# Patient Record
Sex: FEMALE | Race: WHITE | NOT HISPANIC OR LATINO | Employment: OTHER | URBAN - METROPOLITAN AREA
[De-identification: names, ages, dates, MRNs, and addresses within clinical notes are randomized per-mention and may not be internally consistent; named-entity substitution may affect disease eponyms.]

---

## 2023-09-06 ENCOUNTER — TELEPHONE (OUTPATIENT)
Dept: PRIMARY CARE | Facility: CLINIC | Age: 53
End: 2023-09-06

## 2023-09-06 DIAGNOSIS — Z00.00 ROUTINE HEALTH MAINTENANCE: ICD-10-CM

## 2023-09-06 NOTE — TELEPHONE ENCOUNTER
Pt calling she never had her labs drawn in Jan she would like to go this Fri can these be reactivated in her chart?

## 2023-09-06 NOTE — TELEPHONE ENCOUNTER
Last appt. 1/5/23, Next appt. 1/9/2024  Last lab 2/16/2021 per Dr. Caraballo for CMP, CBC, and Lipids  Doesn't look like she has many chronic issues.

## 2023-09-07 NOTE — TELEPHONE ENCOUNTER
LM notifying pt. Lab orders are on her chart and she should be fasting to call again if further questions.

## 2023-09-07 NOTE — TELEPHONE ENCOUNTER
Pt called in asking if labs have been added to chart, I advised pt that the message was sent, it may take 24-48 hours.

## 2023-09-08 ENCOUNTER — LAB (OUTPATIENT)
Dept: LAB | Facility: LAB | Age: 53
End: 2023-09-08
Payer: COMMERCIAL

## 2023-09-08 DIAGNOSIS — Z00.00 ROUTINE HEALTH MAINTENANCE: ICD-10-CM

## 2023-09-08 LAB
ALANINE AMINOTRANSFERASE (SGPT) (U/L) IN SER/PLAS: 23 U/L (ref 7–45)
ALBUMIN (G/DL) IN SER/PLAS: 4.2 G/DL (ref 3.4–5)
ALKALINE PHOSPHATASE (U/L) IN SER/PLAS: 142 U/L (ref 33–110)
ANION GAP IN SER/PLAS: 11 MMOL/L (ref 10–20)
ASPARTATE AMINOTRANSFERASE (SGOT) (U/L) IN SER/PLAS: 22 U/L (ref 9–39)
BILIRUBIN TOTAL (MG/DL) IN SER/PLAS: 0.2 MG/DL (ref 0–1.2)
CALCIUM (MG/DL) IN SER/PLAS: 9.9 MG/DL (ref 8.6–10.6)
CARBON DIOXIDE, TOTAL (MMOL/L) IN SER/PLAS: 29 MMOL/L (ref 21–32)
CHLORIDE (MMOL/L) IN SER/PLAS: 104 MMOL/L (ref 98–107)
CHOLESTEROL (MG/DL) IN SER/PLAS: 244 MG/DL (ref 0–199)
CHOLESTEROL IN HDL (MG/DL) IN SER/PLAS: 63.8 MG/DL
CHOLESTEROL/HDL RATIO: 3.8
CREATININE (MG/DL) IN SER/PLAS: 0.97 MG/DL (ref 0.5–1.05)
ERYTHROCYTE DISTRIBUTION WIDTH (RATIO) BY AUTOMATED COUNT: 14 % (ref 11.5–14.5)
ERYTHROCYTE MEAN CORPUSCULAR HEMOGLOBIN CONCENTRATION (G/DL) BY AUTOMATED: 32.8 G/DL (ref 32–36)
ERYTHROCYTE MEAN CORPUSCULAR VOLUME (FL) BY AUTOMATED COUNT: 92 FL (ref 80–100)
ERYTHROCYTES (10*6/UL) IN BLOOD BY AUTOMATED COUNT: 5.06 X10E12/L (ref 4–5.2)
FOLLITROPIN (IU/L) IN SER/PLAS: 109.9 IU/L
GFR FEMALE: 71 ML/MIN/1.73M2
GLUCOSE (MG/DL) IN SER/PLAS: 78 MG/DL (ref 74–99)
HEMATOCRIT (%) IN BLOOD BY AUTOMATED COUNT: 46.7 % (ref 36–46)
HEMOGLOBIN (G/DL) IN BLOOD: 15.3 G/DL (ref 12–16)
LDL: 151 MG/DL (ref 0–99)
LEUKOCYTES (10*3/UL) IN BLOOD BY AUTOMATED COUNT: 8.5 X10E9/L (ref 4.4–11.3)
NRBC (PER 100 WBCS) BY AUTOMATED COUNT: 0 /100 WBC (ref 0–0)
PLATELETS (10*3/UL) IN BLOOD AUTOMATED COUNT: 250 X10E9/L (ref 150–450)
POTASSIUM (MMOL/L) IN SER/PLAS: 3.8 MMOL/L (ref 3.5–5.3)
PROTEIN TOTAL: 7.2 G/DL (ref 6.4–8.2)
SODIUM (MMOL/L) IN SER/PLAS: 140 MMOL/L (ref 136–145)
TRIGLYCERIDE (MG/DL) IN SER/PLAS: 148 MG/DL (ref 0–149)
UREA NITROGEN (MG/DL) IN SER/PLAS: 20 MG/DL (ref 6–23)
VLDL: 30 MG/DL (ref 0–40)

## 2023-09-08 PROCEDURE — 80061 LIPID PANEL: CPT

## 2023-09-08 PROCEDURE — 36415 COLL VENOUS BLD VENIPUNCTURE: CPT

## 2023-09-08 PROCEDURE — 80053 COMPREHEN METABOLIC PANEL: CPT

## 2023-09-08 PROCEDURE — 85027 COMPLETE CBC AUTOMATED: CPT

## 2024-01-05 PROBLEM — R53.81 MALAISE AND FATIGUE: Status: ACTIVE | Noted: 2024-01-05

## 2024-01-05 PROBLEM — R53.83 MALAISE AND FATIGUE: Status: ACTIVE | Noted: 2024-01-05

## 2024-01-05 PROBLEM — K51.90 ULCERATIVE COLITIS (MULTI): Status: ACTIVE | Noted: 2024-01-05

## 2024-01-05 PROBLEM — E66.3 OVERWEIGHT (BMI 25.0-29.9): Status: ACTIVE | Noted: 2019-01-10

## 2024-01-05 PROBLEM — R33.9 URINARY RETENTION: Status: ACTIVE | Noted: 2024-01-05

## 2024-01-05 PROBLEM — E55.9 VITAMIN D DEFICIENCY: Status: ACTIVE | Noted: 2024-01-05

## 2024-01-05 RX ORDER — CALCIUM CARBONATE/VITAMIN D3 600 MG-10
1 TABLET ORAL DAILY
COMMUNITY
Start: 2023-01-05

## 2024-01-05 RX ORDER — SODIUM PICOSULFATE, MAGNESIUM OXIDE, AND ANHYDROUS CITRIC ACID 10; 3.5; 12 MG/160ML; G/160ML; G/160ML
LIQUID ORAL
COMMUNITY
Start: 2023-04-04 | End: 2024-01-09 | Stop reason: ALTCHOICE

## 2024-01-05 RX ORDER — ASCORBIC ACID 500 MG
1 TABLET ORAL DAILY
COMMUNITY
Start: 2023-01-05

## 2024-01-05 RX ORDER — VIT C/E/ZN/COPPR/LUTEIN/ZEAXAN 250MG-90MG
1 CAPSULE ORAL DAILY
COMMUNITY
Start: 2023-01-05

## 2024-01-05 RX ORDER — BIOTIN 10 MG
1 TABLET ORAL DAILY
COMMUNITY

## 2024-01-05 RX ORDER — FLUTICASONE PROPIONATE 50 MCG
SPRAY, SUSPENSION (ML) NASAL
COMMUNITY
Start: 2014-04-21

## 2024-01-05 RX ORDER — MESALAMINE 1.2 G/1
4.8 TABLET, DELAYED RELEASE ORAL
COMMUNITY

## 2024-01-05 RX ORDER — POLYETHYLENE GLYCOL 3350 17 G/17G
POWDER, FOR SOLUTION ORAL
COMMUNITY
Start: 2018-12-17 | End: 2024-01-09 | Stop reason: ALTCHOICE

## 2024-01-05 RX ORDER — COPPER 313.4 MG/1
INTRAUTERINE DEVICE INTRAUTERINE
COMMUNITY

## 2024-01-09 ENCOUNTER — OFFICE VISIT (OUTPATIENT)
Dept: PRIMARY CARE | Facility: CLINIC | Age: 54
End: 2024-01-09
Payer: COMMERCIAL

## 2024-01-09 VITALS
BODY MASS INDEX: 29.07 KG/M2 | HEART RATE: 65 BPM | WEIGHT: 154 LBS | HEIGHT: 61 IN | OXYGEN SATURATION: 96 % | SYSTOLIC BLOOD PRESSURE: 112 MMHG | DIASTOLIC BLOOD PRESSURE: 70 MMHG

## 2024-01-09 DIAGNOSIS — E55.9 VITAMIN D DEFICIENCY: ICD-10-CM

## 2024-01-09 DIAGNOSIS — Z00.00 WELL ADULT EXAM: Primary | ICD-10-CM

## 2024-01-09 DIAGNOSIS — K51.00 ULCERATIVE PANCOLITIS WITHOUT COMPLICATION (MULTI): ICD-10-CM

## 2024-01-09 DIAGNOSIS — R53.83 FATIGUE, UNSPECIFIED TYPE: ICD-10-CM

## 2024-01-09 PROCEDURE — 99396 PREV VISIT EST AGE 40-64: CPT | Performed by: FAMILY MEDICINE

## 2024-01-09 PROCEDURE — 1036F TOBACCO NON-USER: CPT | Performed by: FAMILY MEDICINE

## 2024-01-09 RX ORDER — GLUCOSAM/CHONDRO/HERB 149/HYAL 750-100 MG
TABLET ORAL
COMMUNITY

## 2024-01-09 NOTE — PROGRESS NOTES
ROS :  ( No or Yes )  Do you have:  Any eye problems:    N  2. Frequent nasal congestion or sneezing:  N  3. Difficulty hearing:  N  4. Ear problems:   N  Are you troubled by:  5. Asthma or wheezing:   N  6. Frequent cough:   N  7. Shortness of breath:N  8. Hemoptysis: N  9. Hx of TB: N  Do you have or have you been told you had:  10. High blood pressure: N  11. Heart disease: N  12. Heart murmur: N  Do you ever have:  13. Chest pain or pressure with exertion:N  14. Leg pains with walking up hill: N  15. Fast heartbeat or palpitations:N  16. Varicose veins: N  Do you have or are you troubled by:  17. Difficulty swallowing foods or liquids: N  18. Abdominal pains: N  19. Frequent indigestion or heartburn: N  20. Constipation: N  21. Diarrhea or loose stools: N  Has there been a definite change:  22. In weight recently: N  23. In bowel movements: N  Have you ever had or been told you have:  24. An ulcer: N  25. Black stools: N  26. Jaundice, hepatitis or liver problems: N  27. Gallstones or gallbladder problems: N  28. Stomach or intestinal problems: N  Have you ever:  29. Vomited blood : N  30. Blood in bowel movements: N  31. Been anemic or been treated for blood problems: N  32. Had sickle cell trait or anemia: N  33. Been refused as a blood donor:   Have you had or do you have:  34. Problems with your kidney, bladder, or prostate: N  35. Loss of control of your urine: N  36. Pain or burning with urination: N  37. Blood in your urine: N  38. Trouble starting flow of urine: N  39. Frequent urination at night: N  Have you ever been treated for or told you had:  40. Venereal disease: N  Do you have:  41. Any skin problems: N  42. Diabetes: N  43. Thyroid disease: N  Are you troubled by:  44. Frequent back pain: N  45. Pain or swelling around joints: N  Have you ever:  46. Broken any bones: N  Are you troubled by:  47. Frequent headaches: N  48. Dizziness: N  49. Had Seizures or convulsions: N  50. Temporarily lost  "control of your hand or foot : N   51. Had a stroke or been paralyzed : N  52. Temporarily lost your ability to speak: N  53. Fainted or lost consciousness: N  Have you ever had:  54. Hallucinations: N  55. Much trouble with Nervousness: N  56. Do you take medications for your nerves: N  57. Trouble falling asleep or staying asleep: N  58. Do you feel tired even after a good night sleep: N  59. Do you often feel down in the dumps or depressed: N  60. Do you often feel like crying without any reason: N  61. Do you think that you may be using alcohol excessively: N  62. Do you use any street drugs : N    Do have any other medical problems that are concerning to you :   Subjective   Patient ID: Christy Lim is a 53 y.o. female who presents for Annual Exam (Dr. Caraballo orders mammograms).    HPI   Deciding on her gastroenterologist for ulcerative colitis  No current symptoms and continues to take mesalamine    Review of Systems    Objective   /70   Pulse 65   Ht 1.537 m (5' 0.5\")   Wt 69.9 kg (154 lb)   LMP  (LMP Unknown) Comment: IUD  SpO2 96%   BMI 29.58 kg/m²     Physical Exam  HENT:      Head: Normocephalic and atraumatic.      Nose: Nose normal.      Mouth/Throat:      Mouth: Mucous membranes are moist.      Pharynx: No oropharyngeal exudate.   Eyes:      Extraocular Movements: Extraocular movements intact.      Conjunctiva/sclera: Conjunctivae normal.      Pupils: Pupils are equal, round, and reactive to light.   Cardiovascular:      Rate and Rhythm: Normal rate and regular rhythm.   Pulmonary:      Effort: Pulmonary effort is normal.   Abdominal:      General: There is no distension.      Palpations: Abdomen is soft.   Musculoskeletal:      Cervical back: Normal range of motion and neck supple.   Lymphadenopathy:      Cervical: No cervical adenopathy.   Neurological:      General: No focal deficit present.      Mental Status: She is alert.   Psychiatric:         Attention and Perception: Attention " normal.         Speech: Speech normal.         Behavior: Behavior is cooperative.         Assessment/Plan   Diagnoses and all orders for this visit:  Well adult exam  Comments:   LM discussed  Orders:  -     Lipid Panel; Future  -     CBC and Auto Differential; Future  -     Comprehensive Metabolic Panel; Future  -     Sedimentation Rate; Future  -     C-Reactive Protein; Future  Ulcerative pancolitis without complication (CMS/HCC)  Comments:  Patient will follow-up with gastroenterology and continue current medication  Orders:  -     Lipid Panel; Future  -     CBC and Auto Differential; Future  -     Comprehensive Metabolic Panel; Future  -     Sedimentation Rate; Future  -     C-Reactive Protein; Future  Vitamin D deficiency  -     Vitamin D 25-Hydroxy,Total (for eval of Vitamin D levels); Future  Fatigue, unspecified type  -     Thyroid Stimulating Hormone; Future    Recheck 1 year sooner if any issues arise

## 2024-01-26 ENCOUNTER — OFFICE VISIT (OUTPATIENT)
Dept: OBSTETRICS AND GYNECOLOGY | Facility: CLINIC | Age: 54
End: 2024-01-26
Payer: COMMERCIAL

## 2024-01-26 VITALS
WEIGHT: 153 LBS | HEIGHT: 61 IN | BODY MASS INDEX: 28.89 KG/M2 | DIASTOLIC BLOOD PRESSURE: 80 MMHG | SYSTOLIC BLOOD PRESSURE: 118 MMHG

## 2024-01-26 DIAGNOSIS — Z30.432 ENCOUNTER FOR IUD REMOVAL: ICD-10-CM

## 2024-01-26 DIAGNOSIS — Z01.419 ENCOUNTER FOR WELL WOMAN EXAM WITH ROUTINE GYNECOLOGICAL EXAM: Primary | ICD-10-CM

## 2024-01-26 DIAGNOSIS — Z12.31 ENCOUNTER FOR SCREENING MAMMOGRAM FOR BREAST CANCER: ICD-10-CM

## 2024-01-26 PROCEDURE — 99396 PREV VISIT EST AGE 40-64: CPT | Performed by: OBSTETRICS & GYNECOLOGY

## 2024-01-26 PROCEDURE — 58301 REMOVE INTRAUTERINE DEVICE: CPT | Performed by: OBSTETRICS & GYNECOLOGY

## 2024-01-26 PROCEDURE — 1036F TOBACCO NON-USER: CPT | Performed by: OBSTETRICS & GYNECOLOGY

## 2024-01-26 NOTE — PROGRESS NOTES
PAP 1-27-23 NEG HPV NEG  MAMMO 1-  DEXA NEVER  COLON 3-5-20    ASSESSMENT/PLAN  1. Encounter for well woman exam with routine gynecological exam  Normal well woman visit.   No pap done. Last pap 2023 was normal and HPV negative.     2. Encounter for screening mammogram for breast cancer  A screening mammogram requisition has been placed.   Please schedule your mammogram     3. Encounter for IUD removal  Paragard IUD removed without issue.     SUBJECTIVE    HPI    52 yo G0 for routine well woman visit.   Last visit 3/2022.   Paragard IUD placed 6/2020. Last cycle approx 2 years ago. FSH 9-8-2023 109 consistent with menopause. When I called pt with results, we talked about removing paragard at next visit. Pt not sure if she wants it out at this visit. Was very painful going in. We discussed that removal is easier. Pt agrees to removal today.   Overall pt coping okay without any major menopausal sx. Emotionally she has found it challenging to know that menopause definitively means she can never have a baby. She is working through this concept but overall coping okay.   Ulcerative colitis is stable.   Up to date with PCP Dr. Wadsworth.   Denies any intervening medical or surgical issues.   , no smoke, occ ETOH.    Review of Systems    Constitutional: no fever, no chills, no recent weight gain, no recent weight loss and no fatigue.   Eyes: no eye pain, no vision problems and no dryness of the eyes.   ENT: no hearing loss, no nosebleeds and no sinus congestion.   Cardiovascular: no chest pain, no palpitations and no orthopnea.   Respiratory: no shortness of breath, no cough and no wheezing.   Gastrointestinal: no abdominal pain, no constipation, no nausea, no diarrhea and no vomiting.   Genitourinary: no pelvic pain, no dysuria, no urinary incontinence, no vaginal dryness, no vaginal itching, no dyspareunia, no dysmenorrhea, no sexual problems, no change in urinary frequency, no vaginal discharge, no  "unexplained vaginal bleeding and no lesion/sore.   Musculoskeletal: no back pain, no joint swelling and no leg edema.   Integumentary: no rashes, no skin lesions, no breast pain, no nipple discharge and no breast lump.   Neurological: no headache, no numbness and no dizziness.   Psychiatric: no sleep disturbances, no anxiety and no depression.   Endocrine: no hot flashes, no loss of hair and no hirsutism.   Hematologic/Lymphatic: no swollen glands, no tendency for easy bleeding and no tendency for easy bruising.      OBJECTIVE    /80   Ht 1.543 m (5' 0.75\")   Wt 69.4 kg (153 lb)   LMP  (LMP Unknown) Comment: IUD  BMI 29.15 kg/m²     Physical Exam     Constitutional: Alert and in no acute distress. Well developed, well nourished   Head and Face: Head and face: normal   Eyes: Normal external exam - nonicteric sclera, extraocular movements intact (EOMI) and no ptosis.   Ears, Nose, Mouth, and Throat: External inspection of ears and nose: normal   Neck: no neck asymmetry. Supple and thyroid not enlarged and there were no palpable thyroid nodules   Cardiovascular: Heart rate and rhythm were normal, normal S1 and S2, no gallops, and no murmurs   Pulmonary: No respiratory distress and clear bilateral breath sounds   Chest: Breasts: normal appearance, no nipple discharge and no skin changes and palpation of breasts and axillae: no palpable mass and no axillary lymphadenopathy   Abdomen: soft nontender; no abdominal mass palpated, no organomegaly and no hernias   Genitourinary: external genitalia: normal, no inguinal lymphadenopathy, Bartholin's urethral and Harleigh's glands: normal, urethra: normal, bladder: normal on palpation and perianal area: normal   Vagina: normal.   Cervix: Normal appearing without lesions. Strings at os.   Uterus: Normal, mobile, nontender.  Right Adnexa/parametria: Normal.   Left Adnexa/parametria: Normal.   Skin: normal skin color and pigmentation, normal skin turgor, and no rash. "   Psychiatric: alert and oriented x 3., affect normal to patient baseline and mood: appropriate      IUD Removal    Date/Time: 1/26/2024 3:01 PM    Performed by: Claudine Tate MD  Authorized by: Claudine Tate MD    Consent:     Consent obtained:  Written and verbal    Consent given by:  Patient    Procedure risks and benefits discussed: yes      Patient questions answered: yes      Patient agrees, verbalizes understanding, and wants to proceed: yes    Procedure:     Removed with no complications: yes       Claudine Tate MD

## 2024-04-26 ENCOUNTER — HOSPITAL ENCOUNTER (OUTPATIENT)
Dept: RADIOLOGY | Facility: CLINIC | Age: 54
Discharge: HOME | End: 2024-04-26
Payer: COMMERCIAL

## 2024-04-26 VITALS — BODY MASS INDEX: 28.89 KG/M2 | WEIGHT: 153 LBS | HEIGHT: 61 IN

## 2024-04-26 DIAGNOSIS — Z12.31 ENCOUNTER FOR SCREENING MAMMOGRAM FOR BREAST CANCER: ICD-10-CM

## 2024-04-26 PROCEDURE — 77067 SCR MAMMO BI INCL CAD: CPT | Performed by: RADIOLOGY

## 2024-04-26 PROCEDURE — 77067 SCR MAMMO BI INCL CAD: CPT

## 2024-04-26 PROCEDURE — 77063 BREAST TOMOSYNTHESIS BI: CPT | Performed by: RADIOLOGY

## 2024-05-10 ENCOUNTER — TELEPHONE (OUTPATIENT)
Dept: OBSTETRICS AND GYNECOLOGY | Facility: CLINIC | Age: 54
End: 2024-05-10
Payer: COMMERCIAL

## 2024-07-01 ENCOUNTER — OFFICE VISIT (OUTPATIENT)
Dept: PRIMARY CARE | Facility: CLINIC | Age: 54
End: 2024-07-01
Payer: COMMERCIAL

## 2024-07-01 VITALS
SYSTOLIC BLOOD PRESSURE: 114 MMHG | OXYGEN SATURATION: 98 % | DIASTOLIC BLOOD PRESSURE: 72 MMHG | WEIGHT: 154 LBS | BODY MASS INDEX: 29.07 KG/M2 | HEIGHT: 61 IN | HEART RATE: 71 BPM

## 2024-07-01 DIAGNOSIS — S20.362A INSECT BITE OF LEFT FRONT WALL OF THORAX, INITIAL ENCOUNTER: Primary | ICD-10-CM
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
## 2024-07-01 DIAGNOSIS — E78.2 MIXED HYPERLIPIDEMIA: ICD-10-CM

## 2024-07-01 PROCEDURE — 99213 OFFICE O/P EST LOW 20 MIN: CPT | Performed by: FAMILY MEDICINE

## 2024-07-01 PROCEDURE — 1036F TOBACCO NON-USER: CPT | Performed by: FAMILY MEDICINE

## 2024-07-01 ASSESSMENT — COLUMBIA-SUICIDE SEVERITY RATING SCALE - C-SSRS
1. IN THE PAST MONTH, HAVE YOU WISHED YOU WERE DEAD OR WISHED YOU COULD GO TO SLEEP AND NOT WAKE UP?: NO
6. HAVE YOU EVER DONE ANYTHING, STARTED TO DO ANYTHING, OR PREPARED TO DO ANYTHING TO END YOUR LIFE?: NO
2. HAVE YOU ACTUALLY HAD ANY THOUGHTS OF KILLING YOURSELF?: NO

## 2024-07-01 ASSESSMENT — ENCOUNTER SYMPTOMS
APPETITE CHANGE: 0
UNEXPECTED WEIGHT CHANGE: 0
NAUSEA: 0

## 2024-07-01 ASSESSMENT — PATIENT HEALTH QUESTIONNAIRE - PHQ9
2. FEELING DOWN, DEPRESSED OR HOPELESS: NOT AT ALL
SUM OF ALL RESPONSES TO PHQ9 QUESTIONS 1 AND 2: 0
1. LITTLE INTEREST OR PLEASURE IN DOING THINGS: NOT AT ALL

## 2024-07-01 NOTE — PROGRESS NOTES
"Subjective   Patient ID: Christy Lim is a 53 y.o. female who presents for Insect Bite (Left side by the ribs itchy with red ring since Saturday).    HPI   Area is slightly itchy and she thought there was redness around it this morning  No ticks noted on her  No fever chills  No pain    Review of Systems   Constitutional:  Negative for appetite change and unexpected weight change.   Eyes:  Negative for visual disturbance.   Gastrointestinal:  Negative for nausea.       Objective   /72   Pulse 71   Ht 1.543 m (5' 0.75\")   Wt 69.9 kg (154 lb)   SpO2 98%   BMI 29.34 kg/m²     Physical Exam  HENT:      Head: Normocephalic and atraumatic.      Nose: Nose normal.      Mouth/Throat:      Mouth: Mucous membranes are moist.      Pharynx: No oropharyngeal exudate.   Eyes:      Extraocular Movements: Extraocular movements intact.      Conjunctiva/sclera: Conjunctivae normal.      Pupils: Pupils are equal, round, and reactive to light.   Cardiovascular:      Rate and Rhythm: Normal rate and regular rhythm.   Pulmonary:      Effort: Pulmonary effort is normal.   Abdominal:      General: There is no distension.      Palpations: Abdomen is soft.   Musculoskeletal:      Cervical back: Normal range of motion and neck supple.   Lymphadenopathy:      Cervical: No cervical adenopathy.   Neurological:      General: No focal deficit present.      Mental Status: She is alert.   Psychiatric:         Attention and Perception: Attention normal.         Speech: Speech normal.         Behavior: Behavior is cooperative.     Left flank under bra there is a 2 mm diameter erythematous papule  No other skin lesion noted    Assessment/Plan   Diagnoses and all orders for this visit:  Insect bite of left front wall of thorax, initial encounter  Comments:  Local care discussed  Reassurance that this does not appear to be a tick bite or any infection  Mixed hyperlipidemia  Comments:  Mediterranean diet and stay active    Recheck 1 week if " not better sooner if worse

## 2024-07-06 ENCOUNTER — OFFICE VISIT (OUTPATIENT)
Dept: PRIMARY CARE | Facility: CLINIC | Age: 54
End: 2024-07-06
Payer: COMMERCIAL

## 2024-07-06 VITALS — TEMPERATURE: 98.4 F
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
## 2024-07-06 DIAGNOSIS — S20.362A INSECT BITE OF LEFT FRONT WALL OF THORAX, INITIAL ENCOUNTER: Primary | ICD-10-CM

## 2024-07-06 ASSESSMENT — ENCOUNTER SYMPTOMS
UNEXPECTED WEIGHT CHANGE: 0
NAUSEA: 0
APPETITE CHANGE: 0

## 2024-07-06 NOTE — PROGRESS NOTES
Subjective   Patient ID: Christy Lim is a 53 y.o. female who presents for Insect Bite (2 red marks with a [place] around it located above bra line at the armpit doesn't look like a bulls eye denies fever or itching/Frustrated because it wasn't seen while she was here on Mon. ).    HPI   No fever chills  No discharge  No numbness [place] weakness  Not itchy or painful    Review of Systems   Constitutional:  Negative for appetite change and unexpected weight change.   Eyes:  Negative for visual disturbance.   Gastrointestinal:  Negative for nausea.       Objective   Temp 36.9 °C (98.4 °F)     Physical Exam  HENT:      Head: Normocephalic and atraumatic.      Nose: Nose normal.      Mouth/Throat:      Mouth: Mucous membranes are moist.      Pharynx: No oropharyngeal exudate.   Eyes:      Extraocular Movements: Extraocular movements intact.      Conjunctiva/sclera: Conjunctivae normal.      Pupils: Pupils are equal, round, and reactive to light.   Cardiovascular:      Rate and Rhythm: Normal rate and regular rhythm.   Pulmonary:      Effort: Pulmonary effort is normal.   Abdominal:      General: There is no distension.      Palpations: Abdomen is soft.   Musculoskeletal:      Cervical back: Normal range of motion and neck supple.   Lymphadenopathy:      Cervical: No cervical adenopathy.   Neurological:      General: No focal deficit present.      Mental Status: She is alert.   Psychiatric:         Attention and Perception: Attention normal.         Speech: Speech normal.         Behavior: Behavior is cooperative.     2 erythematous papules 2 mm diameter each and located about 3 mm apart in left axilla with slight erythema surrounding 1 inch diameter    Assessment/Plan   Diagnoses and all orders for this visit:  Insect bite of left front wall of thorax, initial encounter  Discussed doubt tick bites or risk of infection  Since not painful doubt cellulitis  Does not appear to be excoriated and patient says it is not  itchy  Discussed possibility of antibiotic or Lyme disease testing patient defers  She will follow-up very closely and let me know if anything changes

## 2024-09-24 ENCOUNTER — TELEPHONE (OUTPATIENT)
Dept: OBSTETRICS AND GYNECOLOGY | Facility: CLINIC | Age: 54
End: 2024-09-24
Payer: COMMERCIAL

## 2024-10-24 ENCOUNTER — TELEPHONE (OUTPATIENT)
Dept: OBSTETRICS AND GYNECOLOGY | Facility: CLINIC | Age: 54
End: 2024-10-24
Payer: COMMERCIAL

## 2024-12-17 ENCOUNTER — TELEPHONE (OUTPATIENT)
Dept: PRIMARY CARE | Facility: CLINIC | Age: 54
End: 2024-12-17
Payer: COMMERCIAL

## 2025-01-07 ENCOUNTER — APPOINTMENT (OUTPATIENT)
Dept: PRIMARY CARE | Facility: CLINIC | Age: 55
End: 2025-01-07
Payer: COMMERCIAL

## 2025-01-08 NOTE — PROGRESS NOTES
"  ASSESSMENT/PLAN    1. Encounter for well woman exam with routine gynecological exam (Primary)  Routine well woman visit.   Your exam was normal today.       2. Encounter for screening mammogram for breast cancer  Your clinical breast exam was normal.   A screening mammogram order has been placed for 4/2025.   Please schedule your mammogram.       -------------------------------------------------------------------------    SUBJECTIVE    PAP 1-27-23 NEG HPV NEG  MAMMO 4/26/2024  DEXA NEVER  COLON 3-5-20    HPI    53 yo G0 for routine well woman visit.   Last visit 1/2024.   Paragard IUD removed last visit.   No bleeding and menopausal since approx age [age]. No major symptoms.   Ulcerative colitis is stable.   Up to date with PCP Dr. Wadsworth.   Frustrated regarding weight she gained back.   Denies any intervening medical or surgical issues.   , no smoke, ETOH 1-2/month.    REVIEW OF SYSTEMS    Constitutional: no recent weight gain, no fatigue.   ENT: no hearing loss.  Cardiovascular: no chest pain, no palpitations.  Respiratory: no shortness of breath.  Gastrointestinal: no abdominal pain, no constipation, no nausea, no diarrhea.  Musculoskeletal: no back pain.  Lymphatic: no swollen glands.  Genitourinary: no pelvic pain, no urinary urgency, no urinary incontinence, no change in urinary frequency, no vaginal dryness, no vaginal itching,  no vaginal discharge, no unexplained vaginal bleeding, no lesion/sore.   Breasts: no breast pain, no nipple discharge, no breast lump.   Neurological: no headache, no numbness, no dizziness.   Psychiatric: no sleep disturbances, no anxiety, no depression.   Endocrine: no hot flashes, no loss of hair, no hirsutism.       OBJECTIVE    /70   Ht 1.543 m (5' 0.75\")   Wt 73.9 kg (163 lb)   BMI 31.05 kg/m²      Physical Exam     Constitutional: Alert and in no acute distress. Well developed, well nourished   Head and Face: Head and face: normal   Eyes: Normal external " exam - nonicteric sclera.  Ears, Nose, Mouth, and Throat: External inspection of ears and nose: normal   Neck: no neck asymmetry. Supple and thyroid not enlarged and there were no palpable thyroid nodules   Cardiovascular: Heart rate and rhythm were normal  Pulmonary: No respiratory distress and clear bilateral breath sounds   Chest: Breasts: normal appearance, no nipple discharge, no skin changes. Palpation of breasts and axillae: no palpable mass, no axillary lymphadenopathy   Abdomen: soft nontender; no abdominal mass palpated, no organomegaly and no hernias   Genitourinary: external genitalia: normal appearing vulva and labia without lesions. No inguinal lymphadenopathy,    Urethra: normal.   Bladder: normal on palpation.   Perianal area: normal   Vagina: normal, without significant discharge, no lesions.  Cervix: Normal appearing without lesions.  Uterus: Normal, mobile, nontender.  Right and left adnexa/parametria: Normal  Skin: normal skin color and pigmentation, normal skin turgor, and no rash  Psychiatric: alert and oriented x 3., affect normal to patient baseline and mood: appropriate        Claudine Tate MD

## 2025-01-09 ENCOUNTER — APPOINTMENT (OUTPATIENT)
Dept: OBSTETRICS AND GYNECOLOGY | Facility: CLINIC | Age: 55
End: 2025-01-09
Payer: COMMERCIAL

## 2025-01-09 VITALS
SYSTOLIC BLOOD PRESSURE: 122 MMHG | HEIGHT: 61 IN | WEIGHT: 163 LBS | DIASTOLIC BLOOD PRESSURE: 70 MMHG | BODY MASS INDEX: 30.78 KG/M2

## 2025-01-09 DIAGNOSIS — Z12.31 ENCOUNTER FOR SCREENING MAMMOGRAM FOR BREAST CANCER: ICD-10-CM

## 2025-01-09 DIAGNOSIS — Z01.419 ENCOUNTER FOR WELL WOMAN EXAM WITH ROUTINE GYNECOLOGICAL EXAM: Primary | ICD-10-CM

## 2025-01-09 PROCEDURE — 99396 PREV VISIT EST AGE 40-64: CPT | Performed by: OBSTETRICS & GYNECOLOGY

## 2025-01-09 PROCEDURE — 3008F BODY MASS INDEX DOCD: CPT | Performed by: OBSTETRICS & GYNECOLOGY

## 2025-01-09 PROCEDURE — 1036F TOBACCO NON-USER: CPT | Performed by: OBSTETRICS & GYNECOLOGY

## 2025-01-23 PROBLEM — B01.9 VARICELLA: Status: RESOLVED | Noted: 2025-01-23 | Resolved: 2025-01-23

## 2025-01-23 PROBLEM — Z97.5 PRESENCE OF INTRAUTERINE CONTRACEPTIVE DEVICE: Status: ACTIVE | Noted: 2025-01-23

## 2025-01-25 ENCOUNTER — APPOINTMENT (OUTPATIENT)
Dept: PRIMARY CARE | Facility: CLINIC | Age: 55
End: 2025-01-25
Payer: COMMERCIAL

## 2025-01-25 VITALS
HEART RATE: 72 BPM | BODY MASS INDEX: 31.72 KG/M2 | TEMPERATURE: 97.6 F | HEIGHT: 61 IN | WEIGHT: 168 LBS | DIASTOLIC BLOOD PRESSURE: 78 MMHG | OXYGEN SATURATION: 97 % | SYSTOLIC BLOOD PRESSURE: 112 MMHG

## 2025-01-25 DIAGNOSIS — Z00.00 WELL ADULT EXAM: Primary | ICD-10-CM

## 2025-01-25 DIAGNOSIS — Z12.11 ENCOUNTER FOR SCREENING FOR MALIGNANT NEOPLASM OF COLON: ICD-10-CM

## 2025-01-25 DIAGNOSIS — R53.81 MALAISE AND FATIGUE: ICD-10-CM

## 2025-01-25 DIAGNOSIS — R53.83 MALAISE AND FATIGUE: ICD-10-CM

## 2025-01-25 PROCEDURE — 3008F BODY MASS INDEX DOCD: CPT | Performed by: FAMILY MEDICINE

## 2025-01-25 PROCEDURE — 99396 PREV VISIT EST AGE 40-64: CPT | Performed by: FAMILY MEDICINE

## 2025-01-25 PROCEDURE — 1036F TOBACCO NON-USER: CPT | Performed by: FAMILY MEDICINE

## 2025-01-25 NOTE — PROGRESS NOTES
Do you have:  Any eye problems:    N  2. Frequent nasal congestion or sneezing:  N  3. Difficulty hearing:  N  4. Ear problems:   N  Are you troubled by:  5. Asthma or wheezing:   N  6. Frequent cough:   N  7. Shortness of breath:N  8. Hemoptysis: N  9. Hx of TB: N  Do you have or have you been told you had:  10. High blood pressure: N  11. Heart disease: N  12. Heart murmur: N  Do you ever have:  13. Chest pain or pressure with exertion:N  14. Leg pains with walking up hill: N  15. Fast heartbeat or palpitations:N  16. Varicose veins: N  Do you have or are you troubled by:  17. Difficulty swallowing foods or liquids: N  18. Abdominal pains: N  19. Frequent indigestion or heartburn: N  20. Constipation: N  21. Diarrhea or loose stools: N  Has there been a definite change:  22. In weight recently: N  23. In bowel movements: N  Have you ever had or been told you have:  24. An ulcer: N  25. Black stools: N  26. Jaundice, hepatitis or liver problems: N  27. Gallstones or gallbladder problems: N  28. Stomach or intestinal problems: Y, ulcerative colitis  Have you ever:  29. Vomited blood : N  30. Blood in bowel movements: N  31. Been anemic or been treated for blood problems: N  32. Had sickle cell trait or anemia: N  33. Been refused as a blood donor:   Have you had or do you have:  34. Problems with your kidney, bladder, or prostate: N  35. Loss of control of your urine: N  36. Pain or burning with urination: N  37. Blood in your urine: N  38. Trouble starting flow of urine: N  39. Frequent urination at night: N  Have you ever been treated for or told you had:  40. Venereal disease: N  Do you have:  41. Any skin problems: N  42. Diabetes: N  43. Thyroid disease: N  Are you troubled by:  44. Frequent back pain: N  45. Pain or swelling around joints: N  Have you ever:  46. Broken any bones: N  Are you troubled by:  47. Frequent headaches: N  48. Dizziness: N  49. Had Seizures or convulsions: N  50. Temporarily lost  "control of your hand or foot : N   51. Had a stroke or been paralyzed : N  52. Temporarily lost your ability to speak: N  53. Fainted or lost consciousness: N  Have you ever had:  54. Hallucinations: N  55. Much trouble with Nervousness: N  56. Do you take medications for your nerves: N  57. Trouble falling asleep or staying asleep: N  58. Do you feel tired even after a good night sleep: N  59. Do you often feel down in the dumps or depressed: N  60. Do you often feel like crying without any reason: N  61. Do you think that you may be using alcohol excessively: N  62. Do you use any street drugs : N     Do have any other medical problems that are concerning to you :   Subjective   Patient ID: Christy Lim is a 54 y.o. female who presents for Annual Exam (Christy is here today for yearly CPE. /).    HPI     Review of Systems    Objective   /78 (BP Location: Left arm, Patient Position: Sitting)   Pulse 72   Temp 36.4 °C (97.6 °F) (Temporal)   Ht 1.543 m (5' 0.75\")   Wt 76.2 kg (168 lb)   SpO2 97%   BMI 32.01 kg/m²     Physical Exam  HENT:      Head: Normocephalic and atraumatic.      Nose: Nose normal.      Mouth/Throat:      Mouth: Mucous membranes are moist.      Pharynx: No oropharyngeal exudate.   Eyes:      Extraocular Movements: Extraocular movements intact.      Conjunctiva/sclera: Conjunctivae normal.      Pupils: Pupils are equal, round, and reactive to light.   Cardiovascular:      Rate and Rhythm: Normal rate and regular rhythm.   Pulmonary:      Effort: Pulmonary effort is normal.      Breath sounds: Normal breath sounds.   Abdominal:      General: There is no distension.      Palpations: Abdomen is soft.   Musculoskeletal:      Cervical back: Normal range of motion and neck supple.   Lymphadenopathy:      Cervical: No cervical adenopathy.   Neurological:      General: No focal deficit present.      Mental Status: She is alert.   Psychiatric:         Attention and Perception: Attention normal.    "      Speech: Speech normal.         Behavior: Behavior is cooperative.         Assessment/Plan   Problem List Items Addressed This Visit             ICD-10-CM    Malaise and fatigue R53.81, R53.83    Relevant Orders    CBC and Auto Differential    Comprehensive Metabolic Panel    Thyroid Stimulating Hormone    Thyroxine, Free    Lipid Panel     Other Visit Diagnoses         Codes    Well adult exam    -  Primary Z00.00    Relevant Orders    CBC and Auto Differential    Comprehensive Metabolic Panel    Thyroid Stimulating Hormone    Thyroxine, Free    Lipid Panel           YESY discussed  Ordered colonoscopy  Reviewed labs  Recheck 6 months sooner if any issues arise

## 2025-02-28 ENCOUNTER — TELEPHONE (OUTPATIENT)
Dept: PRIMARY CARE | Facility: CLINIC | Age: 55
End: 2025-02-28
Payer: COMMERCIAL

## 2025-02-28 DIAGNOSIS — K51.00 ULCERATIVE PANCOLITIS WITHOUT COMPLICATION (MULTI): ICD-10-CM

## 2025-02-28 RX ORDER — MESALAMINE 1.2 G/1
1200 TABLET, DELAYED RELEASE ORAL 4 TIMES DAILY
Qty: 360 TABLET | Refills: 1 | Status: SHIPPED | OUTPATIENT
Start: 2025-02-28

## 2025-02-28 NOTE — TELEPHONE ENCOUNTER
MEDICATION REFILL    Pharmacy Name:    [facility]  Medication requested         Mesalamine  1.2 grams   Dosage    4 tabs 1 x in evening  Quantity     90 days   Allergies    none given  Date of last visit    01/25/2025  Date of Next Appointment   01/06/2026

## 2025-06-28 LAB
ALBUMIN SERPL-MCNC: 4.2 G/DL (ref 3.6–5.1)
ALP SERPL-CCNC: 95 U/L (ref 37–153)
ALT SERPL-CCNC: 33 U/L (ref 6–29)
ANION GAP SERPL CALCULATED.4IONS-SCNC: 9 MMOL/L (CALC) (ref 7–17)
AST SERPL-CCNC: 27 U/L (ref 10–35)
BASOPHILS # BLD AUTO: 69 CELLS/UL (ref 0–200)
BASOPHILS NFR BLD AUTO: 1 %
BILIRUB SERPL-MCNC: 0.5 MG/DL (ref 0.2–1.2)
BUN SERPL-MCNC: 20 MG/DL (ref 7–25)
CALCIUM SERPL-MCNC: 9.3 MG/DL (ref 8.6–10.4)
CHLORIDE SERPL-SCNC: 102 MMOL/L (ref 98–110)
CHOLEST SERPL-MCNC: 232 MG/DL
CHOLEST/HDLC SERPL: 4.3 (CALC)
CO2 SERPL-SCNC: 29 MMOL/L (ref 20–32)
CREAT SERPL-MCNC: 0.91 MG/DL (ref 0.5–1.03)
EGFRCR SERPLBLD CKD-EPI 2021: 76 ML/MIN/1.73M2
EOSINOPHIL # BLD AUTO: 179 CELLS/UL (ref 15–500)
EOSINOPHIL NFR BLD AUTO: 2.6 %
ERYTHROCYTE [DISTWIDTH] IN BLOOD BY AUTOMATED COUNT: 13 % (ref 11–15)
GLUCOSE SERPL-MCNC: 89 MG/DL (ref 65–99)
HCT VFR BLD AUTO: 50.4 % (ref 35–45)
HDLC SERPL-MCNC: 54 MG/DL
HGB BLD-MCNC: 16.3 G/DL (ref 11.7–15.5)
LDLC SERPL CALC-MCNC: 147 MG/DL (CALC)
LYMPHOCYTES # BLD AUTO: 2691 CELLS/UL (ref 850–3900)
LYMPHOCYTES NFR BLD AUTO: 39 %
MCH RBC QN AUTO: 30 PG (ref 27–33)
MCHC RBC AUTO-ENTMCNC: 32.3 G/DL (ref 32–36)
MCV RBC AUTO: 92.8 FL (ref 80–100)
MONOCYTES # BLD AUTO: 483 CELLS/UL (ref 200–950)
MONOCYTES NFR BLD AUTO: 7 %
NEUTROPHILS # BLD AUTO: 3478 CELLS/UL (ref 1500–7800)
NEUTROPHILS NFR BLD AUTO: 50.4 %
NONHDLC SERPL-MCNC: 178 MG/DL (CALC)
PLATELET # BLD AUTO: 225 THOUSAND/UL (ref 140–400)
PMV BLD REES-ECKER: 9.9 FL (ref 7.5–12.5)
POTASSIUM SERPL-SCNC: 4 MMOL/L (ref 3.5–5.3)
PROT SERPL-MCNC: 7.1 G/DL (ref 6.1–8.1)
RBC # BLD AUTO: 5.43 MILLION/UL (ref 3.8–5.1)
SODIUM SERPL-SCNC: 140 MMOL/L (ref 135–146)
T4 FREE SERPL-MCNC: 1.2 NG/DL (ref 0.8–1.8)
TRIGL SERPL-MCNC: 172 MG/DL
TSH SERPL-ACNC: 2.34 MIU/L
WBC # BLD AUTO: 6.9 THOUSAND/UL (ref 3.8–10.8)

## 2026-01-02 ENCOUNTER — APPOINTMENT (OUTPATIENT)
Dept: PRIMARY CARE | Facility: CLINIC | Age: 56
End: 2026-01-02
Payer: COMMERCIAL

## 2026-01-06 ENCOUNTER — APPOINTMENT (OUTPATIENT)
Dept: PRIMARY CARE | Facility: CLINIC | Age: 56
End: 2026-01-06
Payer: COMMERCIAL